# Patient Record
Sex: MALE | Race: ASIAN | NOT HISPANIC OR LATINO | URBAN - METROPOLITAN AREA
[De-identification: names, ages, dates, MRNs, and addresses within clinical notes are randomized per-mention and may not be internally consistent; named-entity substitution may affect disease eponyms.]

---

## 2018-09-20 ENCOUNTER — NEW PATIENT (OUTPATIENT)
Dept: URBAN - METROPOLITAN AREA CLINIC 56 | Facility: CLINIC | Age: 43
End: 2018-09-20
Payer: COMMERCIAL

## 2018-09-20 DIAGNOSIS — H17.12 CENTRAL CORNEAL OPACITY OF LEFT EYE: ICD-10-CM

## 2018-09-20 DIAGNOSIS — H11.153 PINGUECULA, BILATERAL: Primary | ICD-10-CM

## 2018-09-20 PROCEDURE — 92004 COMPRE OPH EXAM NEW PT 1/>: CPT | Performed by: OPTOMETRIST

## 2018-09-20 ASSESSMENT — VISUAL ACUITY
OS: 20/80
OD: 20/20

## 2018-09-20 ASSESSMENT — INTRAOCULAR PRESSURE
OD: 16
OS: 17

## 2018-09-20 ASSESSMENT — KERATOMETRY
OD: 41.88
OS: 38.40

## 2018-10-17 ENCOUNTER — FOLLOW UP ESTABLISHED (OUTPATIENT)
Dept: URBAN - METROPOLITAN AREA CLINIC 44 | Facility: CLINIC | Age: 43
End: 2018-10-17
Payer: COMMERCIAL

## 2018-10-17 PROCEDURE — 76514 ECHO EXAM OF EYE THICKNESS: CPT | Performed by: OPHTHALMOLOGY

## 2018-10-17 PROCEDURE — 92025 CPTRIZED CORNEAL TOPOGRAPHY: CPT | Performed by: OPHTHALMOLOGY

## 2018-10-17 PROCEDURE — 92014 COMPRE OPH EXAM EST PT 1/>: CPT | Performed by: OPHTHALMOLOGY

## 2018-10-17 ASSESSMENT — INTRAOCULAR PRESSURE
OS: 12
OD: 9

## 2018-10-17 ASSESSMENT — VISUAL ACUITY
OS: 20/80
OD: 20/20

## 2019-10-14 ENCOUNTER — FOLLOW UP ESTABLISHED (OUTPATIENT)
Dept: URBAN - METROPOLITAN AREA CLINIC 51 | Facility: CLINIC | Age: 44
End: 2019-10-14
Payer: MEDICARE

## 2019-10-14 DIAGNOSIS — H35.411 LATTICE DEGENERATION OF RETINA, RIGHT EYE: ICD-10-CM

## 2019-10-14 PROCEDURE — 92014 COMPRE OPH EXAM EST PT 1/>: CPT | Performed by: OPTOMETRIST

## 2019-10-14 ASSESSMENT — KERATOMETRY: OD: 41.70

## 2019-10-14 ASSESSMENT — INTRAOCULAR PRESSURE
OS: 12
OD: 14

## 2019-11-13 ENCOUNTER — FOLLOW UP ESTABLISHED (OUTPATIENT)
Dept: URBAN - METROPOLITAN AREA CLINIC 44 | Facility: CLINIC | Age: 44
End: 2019-11-13
Payer: COMMERCIAL

## 2019-11-13 PROCEDURE — 92025 CPTRIZED CORNEAL TOPOGRAPHY: CPT | Performed by: OPHTHALMOLOGY

## 2019-11-13 PROCEDURE — 76514 ECHO EXAM OF EYE THICKNESS: CPT | Performed by: OPHTHALMOLOGY

## 2019-11-13 PROCEDURE — 92012 INTRM OPH EXAM EST PATIENT: CPT | Performed by: OPHTHALMOLOGY

## 2019-11-13 RX ORDER — PREDNISOLONE ACETATE 10 MG/ML
1 % SUSPENSION/ DROPS OPHTHALMIC
Qty: 1 | Refills: 3 | Status: INACTIVE
Start: 2019-11-13 | End: 2020-08-11

## 2019-11-13 RX ORDER — ACYCLOVIR 400 MG/1
400 MG TABLET ORAL
Qty: 60 | Refills: 1 | Status: INACTIVE
Start: 2019-11-13 | End: 2020-06-01

## 2019-11-13 ASSESSMENT — VISUAL ACUITY
OS: 20/CF 2'
OD: 20/20

## 2019-11-13 ASSESSMENT — INTRAOCULAR PRESSURE
OS: 17
OD: 15

## 2019-11-18 ENCOUNTER — FOLLOW UP ESTABLISHED (OUTPATIENT)
Dept: URBAN - METROPOLITAN AREA CLINIC 51 | Facility: CLINIC | Age: 44
End: 2019-11-18
Payer: COMMERCIAL

## 2019-11-18 PROCEDURE — 92012 INTRM OPH EXAM EST PATIENT: CPT | Performed by: OPTOMETRIST

## 2019-11-18 ASSESSMENT — INTRAOCULAR PRESSURE
OS: 11
OD: 14

## 2020-05-18 ENCOUNTER — FOLLOW UP ESTABLISHED (OUTPATIENT)
Dept: URBAN - METROPOLITAN AREA CLINIC 44 | Facility: CLINIC | Age: 45
End: 2020-05-18
Payer: COMMERCIAL

## 2020-05-18 DIAGNOSIS — H16.322 DIFFUSE INTERSTITIAL KERATITIS, LEFT EYE: Primary | ICD-10-CM

## 2020-05-18 PROCEDURE — 92012 INTRM OPH EXAM EST PATIENT: CPT | Performed by: OPHTHALMOLOGY

## 2020-05-18 RX ORDER — OFLOXACIN 3 MG/ML
0.3 % SOLUTION/ DROPS OPHTHALMIC
Qty: 1 | Refills: 1 | Status: INACTIVE
Start: 2020-05-18 | End: 2020-07-13

## 2020-05-18 RX ORDER — PREDNISOLONE ACETATE 10 MG/ML
1 % SUSPENSION/ DROPS OPHTHALMIC
Qty: 1 | Refills: 3 | Status: INACTIVE
Start: 2020-05-18 | End: 2020-07-13

## 2020-05-18 ASSESSMENT — INTRAOCULAR PRESSURE
OD: 17
OS: 16

## 2020-07-13 ENCOUNTER — FOLLOW UP ESTABLISHED (OUTPATIENT)
Dept: URBAN - METROPOLITAN AREA CLINIC 44 | Facility: CLINIC | Age: 45
End: 2020-07-13
Payer: COMMERCIAL

## 2020-07-13 PROCEDURE — 76514 ECHO EXAM OF EYE THICKNESS: CPT | Performed by: OPHTHALMOLOGY

## 2020-07-13 PROCEDURE — 92014 COMPRE OPH EXAM EST PT 1/>: CPT | Performed by: OPHTHALMOLOGY

## 2020-07-13 RX ORDER — ACYCLOVIR 400 MG/1
400 MG TABLET ORAL
Qty: 90 | Refills: 3 | Status: INACTIVE
Start: 2020-07-13 | End: 2020-09-01

## 2020-07-13 RX ORDER — PREDNISOLONE ACETATE 10 MG/ML
1 % SUSPENSION/ DROPS OPHTHALMIC
Qty: 2 | Refills: 3 | Status: INACTIVE
Start: 2020-07-13 | End: 2021-04-30

## 2020-07-13 RX ORDER — OFLOXACIN 3 MG/ML
0.3 % SOLUTION/ DROPS OPHTHALMIC
Qty: 2 | Refills: 1 | Status: INACTIVE
Start: 2020-07-13 | End: 2020-08-18

## 2020-07-13 ASSESSMENT — VISUAL ACUITY
OD: 20/20
OS: 20/70

## 2020-07-13 ASSESSMENT — KERATOMETRY
OD: 41.75
OS: 38.38

## 2020-07-13 ASSESSMENT — INTRAOCULAR PRESSURE
OD: 19
OS: 13

## 2020-07-27 ENCOUNTER — Encounter (OUTPATIENT)
Dept: URBAN - METROPOLITAN AREA CLINIC 44 | Facility: CLINIC | Age: 45
End: 2020-07-27
Payer: COMMERCIAL

## 2020-07-27 DIAGNOSIS — Z01.818 ENCOUNTER FOR OTHER PREPROCEDURAL EXAMINATION: Primary | ICD-10-CM

## 2020-07-27 PROCEDURE — 99213 OFFICE O/P EST LOW 20 MIN: CPT | Performed by: PHYSICIAN ASSISTANT

## 2020-08-10 ENCOUNTER — SURGERY (OUTPATIENT)
Dept: URBAN - METROPOLITAN AREA SURGERY 19 | Facility: SURGERY | Age: 45
End: 2020-08-10
Payer: COMMERCIAL

## 2020-08-10 PROCEDURE — 65730 CORNEAL TRANSPLANT: CPT | Performed by: OPHTHALMOLOGY

## 2020-08-10 RX ORDER — ACETAMINOPHEN AND CODEINE PHOSPHATE 300; 30 MG/1; MG/1
TABLET ORAL
Qty: 15 | Refills: 0 | Status: ACTIVE
Start: 2020-08-10

## 2020-08-11 ENCOUNTER — POST OP (OUTPATIENT)
Dept: URBAN - METROPOLITAN AREA CLINIC 44 | Facility: CLINIC | Age: 45
End: 2020-08-11

## 2020-08-11 PROCEDURE — 99024 POSTOP FOLLOW-UP VISIT: CPT | Performed by: OPTOMETRIST

## 2020-08-11 ASSESSMENT — INTRAOCULAR PRESSURE: OS: 14

## 2020-08-14 ENCOUNTER — POST OP (OUTPATIENT)
Dept: URBAN - METROPOLITAN AREA CLINIC 51 | Facility: CLINIC | Age: 45
End: 2020-08-14
Payer: COMMERCIAL

## 2020-08-14 PROCEDURE — 99024 POSTOP FOLLOW-UP VISIT: CPT | Performed by: OPTOMETRIST

## 2020-08-14 ASSESSMENT — INTRAOCULAR PRESSURE: OS: 14

## 2020-08-18 ENCOUNTER — POST OP (OUTPATIENT)
Dept: URBAN - METROPOLITAN AREA CLINIC 44 | Facility: CLINIC | Age: 45
End: 2020-08-18

## 2020-08-18 PROCEDURE — 99024 POSTOP FOLLOW-UP VISIT: CPT | Performed by: OPHTHALMOLOGY

## 2020-08-18 RX ORDER — DOXYCYCLINE HYCLATE 100 MG/1
100 MG TABLET, COATED ORAL
Qty: 30 | Refills: 3 | Status: ACTIVE
Start: 2020-08-18

## 2020-08-18 ASSESSMENT — INTRAOCULAR PRESSURE
OD: 15
OS: 21

## 2020-09-01 ENCOUNTER — POST OP (OUTPATIENT)
Dept: URBAN - METROPOLITAN AREA CLINIC 44 | Facility: CLINIC | Age: 45
End: 2020-09-01

## 2020-09-01 PROCEDURE — 99024 POSTOP FOLLOW-UP VISIT: CPT | Performed by: OPHTHALMOLOGY

## 2020-09-01 RX ORDER — ACYCLOVIR 400 MG/1
400 MG TABLET ORAL
Qty: 90 | Refills: 3 | Status: INACTIVE
Start: 2020-09-01 | End: 2021-10-29

## 2020-09-01 ASSESSMENT — INTRAOCULAR PRESSURE
OS: 15
OD: 16

## 2020-09-29 ENCOUNTER — FOLLOW UP ESTABLISHED (OUTPATIENT)
Dept: URBAN - METROPOLITAN AREA CLINIC 44 | Facility: CLINIC | Age: 45
End: 2020-09-29
Payer: COMMERCIAL

## 2020-09-29 DIAGNOSIS — Z94.7 CORNEAL TRANSPLANT STATUS: Primary | ICD-10-CM

## 2020-09-29 PROCEDURE — 99024 POSTOP FOLLOW-UP VISIT: CPT | Performed by: OPHTHALMOLOGY

## 2020-09-29 ASSESSMENT — INTRAOCULAR PRESSURE: OS: 18

## 2020-11-10 ENCOUNTER — FOLLOW UP ESTABLISHED (OUTPATIENT)
Dept: URBAN - METROPOLITAN AREA CLINIC 44 | Facility: CLINIC | Age: 45
End: 2020-11-10
Payer: COMMERCIAL

## 2020-11-10 PROCEDURE — 99024 POSTOP FOLLOW-UP VISIT: CPT | Performed by: OPHTHALMOLOGY

## 2020-11-10 ASSESSMENT — INTRAOCULAR PRESSURE
OS: 14
OD: 13

## 2021-02-16 ENCOUNTER — FOLLOW UP ESTABLISHED (OUTPATIENT)
Dept: URBAN - METROPOLITAN AREA CLINIC 44 | Facility: CLINIC | Age: 46
End: 2021-02-16
Payer: COMMERCIAL

## 2021-02-16 PROCEDURE — 76514 ECHO EXAM OF EYE THICKNESS: CPT | Performed by: OPHTHALMOLOGY

## 2021-02-16 PROCEDURE — 99213 OFFICE O/P EST LOW 20 MIN: CPT | Performed by: OPHTHALMOLOGY

## 2021-02-16 ASSESSMENT — INTRAOCULAR PRESSURE: OS: 15

## 2021-06-08 ENCOUNTER — OFFICE VISIT (OUTPATIENT)
Dept: URBAN - METROPOLITAN AREA CLINIC 44 | Facility: CLINIC | Age: 46
End: 2021-06-08
Payer: COMMERCIAL

## 2021-06-08 PROCEDURE — 92025 CPTRIZED CORNEAL TOPOGRAPHY: CPT | Performed by: OPHTHALMOLOGY

## 2021-06-08 PROCEDURE — 76514 ECHO EXAM OF EYE THICKNESS: CPT | Performed by: OPHTHALMOLOGY

## 2021-06-08 PROCEDURE — 99213 OFFICE O/P EST LOW 20 MIN: CPT | Performed by: OPHTHALMOLOGY

## 2021-06-08 RX ORDER — OFLOXACIN 3 MG/ML
0.3 % SOLUTION/ DROPS OPHTHALMIC
Qty: 5 | Refills: 1 | Status: INACTIVE
Start: 2021-06-08 | End: 2021-10-26

## 2021-06-08 ASSESSMENT — VISUAL ACUITY: OS: 20/80

## 2021-06-08 ASSESSMENT — KERATOMETRY
OD: 41.50
OS: 44.25

## 2021-06-08 ASSESSMENT — INTRAOCULAR PRESSURE
OD: 16
OS: 21

## 2021-06-08 NOTE — IMPRESSION/PLAN
Impression: Central corneal opacity of left eye
- s/p PKP OS 8/10/2020 Plan: POM#10, doing well. Sutures removed at 4 and 7:00, start oflox TID x 3 days then d/c. Cont pred to qd, do not stop. Continue ACV 400mg qD Precautions reviewed. 
RTC 2-3 months, IOP check, alayna, pachy, refract OS, suture removal

## 2021-09-14 ENCOUNTER — OFFICE VISIT (OUTPATIENT)
Dept: URBAN - METROPOLITAN AREA CLINIC 44 | Facility: CLINIC | Age: 46
End: 2021-09-14
Payer: COMMERCIAL

## 2021-09-14 PROCEDURE — 92025 CPTRIZED CORNEAL TOPOGRAPHY: CPT | Performed by: OPHTHALMOLOGY

## 2021-09-14 PROCEDURE — 99213 OFFICE O/P EST LOW 20 MIN: CPT | Performed by: OPHTHALMOLOGY

## 2021-09-14 ASSESSMENT — INTRAOCULAR PRESSURE
OD: 18
OS: 21

## 2021-09-14 ASSESSMENT — VISUAL ACUITY
OS: 20/60
OD: 20/15

## 2021-09-14 NOTE — IMPRESSION/PLAN
Impression: Central corneal opacity of left eye
- s/p PKP OS 8/10/2020 Plan: POY#1, doing well. Sutures removed at 4 and 7:00, start oflox TID x 3 days then d/c. Cont pred to qd, do not stop. Continue ACV 400mg qD Precautions reviewed. 
RTC IOP check, alayna, pachy, refract OS, suture removal

## 2021-10-26 ENCOUNTER — OFFICE VISIT (OUTPATIENT)
Dept: URBAN - METROPOLITAN AREA CLINIC 44 | Facility: CLINIC | Age: 46
End: 2021-10-26
Payer: COMMERCIAL

## 2021-10-26 PROCEDURE — 76514 ECHO EXAM OF EYE THICKNESS: CPT | Performed by: OPHTHALMOLOGY

## 2021-10-26 PROCEDURE — 99213 OFFICE O/P EST LOW 20 MIN: CPT | Performed by: OPHTHALMOLOGY

## 2021-10-26 PROCEDURE — 92025 CPTRIZED CORNEAL TOPOGRAPHY: CPT | Performed by: OPHTHALMOLOGY

## 2021-10-26 RX ORDER — FLUOROMETHOLONE 1 MG/ML
0.1 % SOLUTION/ DROPS OPHTHALMIC
Qty: 5 | Refills: 4 | Status: ACTIVE
Start: 2021-10-26

## 2021-10-26 ASSESSMENT — INTRAOCULAR PRESSURE
OS: 21
OD: 18

## 2021-10-26 ASSESSMENT — VISUAL ACUITY: OS: 20/50

## 2021-10-26 NOTE — IMPRESSION/PLAN
Impression: Unspecified corneal degeneration: H18.40.
- s/p PKP OS 8/10/2020 Plan: POY#1, doing well. 
2 sutures removed, start oflox TID x 3 days then d/c. Cont pred qd, when out switch to [employer] 0.1% qd do not stop. Continue ACV 400mg qD Precautions reviewed. 
RTC IOP check, alayna, pachy, refract OS, suture removal
 not examined

## 2021-10-27 ENCOUNTER — OFFICE VISIT (OUTPATIENT)
Dept: URBAN - METROPOLITAN AREA CLINIC 44 | Facility: CLINIC | Age: 46
End: 2021-10-27
Payer: COMMERCIAL

## 2021-10-27 DIAGNOSIS — H18.40 UNSPECIFIED CORNEAL DEGENERATION: Primary | ICD-10-CM

## 2021-10-27 PROCEDURE — 99213 OFFICE O/P EST LOW 20 MIN: CPT | Performed by: OPHTHALMOLOGY

## 2021-10-27 ASSESSMENT — INTRAOCULAR PRESSURE
OS: 16
OD: 13

## 2021-10-27 NOTE — IMPRESSION/PLAN
Impression: Unspecified corneal degeneration: H18.40.
- s/p PKP OS 8/10/2020 Plan: POY#1, doing well. 1 filament removed, increase lubrication. Cont pred qd, when out switch to [id number] 0.1% qd do not stop. Continue ACV 400mg qD Precautions reviewed. 
RTC IOP check, alayna, pachy, refract OS, suture removal

## 2022-01-06 ENCOUNTER — OFFICE VISIT (OUTPATIENT)
Dept: URBAN - METROPOLITAN AREA CLINIC 44 | Facility: CLINIC | Age: 47
End: 2022-01-06
Payer: COMMERCIAL

## 2022-01-06 DIAGNOSIS — H00.014 HORDEOLUM EXTERNUM LEFT UPPER EYELID: Primary | ICD-10-CM

## 2022-01-06 PROCEDURE — 99214 OFFICE O/P EST MOD 30 MIN: CPT | Performed by: OPTOMETRIST

## 2022-01-06 RX ORDER — CEPHALEXIN 750 MG/1
750 MG CAPSULE ORAL
Qty: 14 | Refills: 0 | Status: ACTIVE
Start: 2022-01-06

## 2022-01-06 NOTE — IMPRESSION/PLAN
Impression: Hordeolum externum left upper eyelid: H00.014. Plan: PLAN: Discussed findings with patient.  Rec lid hygiene, warm compress 4 x a day 10 min intervals and Start Keflex (generic) 750 BID 1 week and RTC PRN

## 2022-01-18 ENCOUNTER — OFFICE VISIT (OUTPATIENT)
Dept: URBAN - METROPOLITAN AREA CLINIC 44 | Facility: CLINIC | Age: 47
End: 2022-01-18
Payer: COMMERCIAL

## 2022-01-18 PROCEDURE — 76514 ECHO EXAM OF EYE THICKNESS: CPT | Performed by: OPHTHALMOLOGY

## 2022-01-18 PROCEDURE — 99213 OFFICE O/P EST LOW 20 MIN: CPT | Performed by: OPHTHALMOLOGY

## 2022-01-18 PROCEDURE — 92025 CPTRIZED CORNEAL TOPOGRAPHY: CPT | Performed by: OPHTHALMOLOGY

## 2022-01-18 ASSESSMENT — VISUAL ACUITY: OS: 20/30

## 2022-01-18 ASSESSMENT — INTRAOCULAR PRESSURE: OD: 18

## 2022-01-18 NOTE — IMPRESSION/PLAN
Impression: Unspecified corneal degeneration: H18.40.
- s/p PKP OS 8/10/2020 Plan: POY#1, doing well. Sutures removed x 2, start oflox TID x 3 days then d/c. Cont pred to qd, do not stop. Continue ACV 400mg qD Precautions reviewed. 
RTC IOP check, alayna, refract OS, suture removal

## 2022-03-01 ENCOUNTER — OFFICE VISIT (OUTPATIENT)
Dept: URBAN - METROPOLITAN AREA CLINIC 44 | Facility: CLINIC | Age: 47
End: 2022-03-01
Payer: COMMERCIAL

## 2022-03-01 PROCEDURE — 99213 OFFICE O/P EST LOW 20 MIN: CPT | Performed by: OPHTHALMOLOGY

## 2022-03-01 PROCEDURE — 92025 CPTRIZED CORNEAL TOPOGRAPHY: CPT | Performed by: OPHTHALMOLOGY

## 2022-03-01 ASSESSMENT — INTRAOCULAR PRESSURE
OD: 16
OS: 19

## 2022-03-01 ASSESSMENT — VISUAL ACUITY: OS: 20/60

## 2022-03-01 NOTE — IMPRESSION/PLAN
Impression: Unspecified corneal degeneration: H18.40.
- s/p PKP OS 8/10/2020 Plan: POY#1, doing well. 1 suture removed, start oflox TID x 3 days then d/c. Cont pred qd, when out switch to [address] 0.1% qd do not stop. Continue ACV 400mg qD Precautions reviewed. 
RTC IOP check, alayna, pachy, auto, refract OS, suture removal

## 2022-05-16 ENCOUNTER — OFFICE VISIT (OUTPATIENT)
Dept: URBAN - METROPOLITAN AREA CLINIC 44 | Facility: CLINIC | Age: 47
End: 2022-05-16
Payer: COMMERCIAL

## 2022-05-16 DIAGNOSIS — H18.40 UNSPECIFIED CORNEAL DEGENERATION: Primary | ICD-10-CM

## 2022-05-16 PROCEDURE — 76514 ECHO EXAM OF EYE THICKNESS: CPT | Performed by: OPHTHALMOLOGY

## 2022-05-16 PROCEDURE — 92025 CPTRIZED CORNEAL TOPOGRAPHY: CPT | Performed by: OPHTHALMOLOGY

## 2022-05-16 PROCEDURE — 99213 OFFICE O/P EST LOW 20 MIN: CPT | Performed by: OPHTHALMOLOGY

## 2022-05-16 ASSESSMENT — INTRAOCULAR PRESSURE
OD: 16
OS: 19

## 2022-05-16 ASSESSMENT — VISUAL ACUITY: OS: 20/25

## 2022-05-16 NOTE — IMPRESSION/PLAN
Impression: Unspecified corneal degeneration: H18.40.
- s/p PKP OS 8/10/2020 Plan: POY#1.5, doing well. Cyl <2D, no further suture removal recommended at this time. Cont FML 0.1% qd do not stop. Continue ACV 400mg qD until out Precautions reviewed. 
May see optom for updated refraction/CTL fitting

## 2022-11-22 ENCOUNTER — OFFICE VISIT (OUTPATIENT)
Dept: URBAN - METROPOLITAN AREA CLINIC 44 | Facility: CLINIC | Age: 47
End: 2022-11-22
Payer: COMMERCIAL

## 2022-11-22 DIAGNOSIS — H18.40 UNSPECIFIED CORNEAL DEGENERATION: Primary | ICD-10-CM

## 2022-11-22 PROCEDURE — 92025 CPTRIZED CORNEAL TOPOGRAPHY: CPT | Performed by: OPHTHALMOLOGY

## 2022-11-22 PROCEDURE — 99213 OFFICE O/P EST LOW 20 MIN: CPT | Performed by: OPHTHALMOLOGY

## 2022-11-22 RX ORDER — FLUOROMETHOLONE 1 MG/ML
0.1 % SOLUTION/ DROPS OPHTHALMIC
Qty: 5 | Refills: 4 | Status: ACTIVE
Start: 2022-11-22

## 2022-11-22 ASSESSMENT — INTRAOCULAR PRESSURE
OS: 14
OD: 14

## 2022-11-22 NOTE — IMPRESSION/PLAN
Impression: Unspecified corneal degeneration: H18.40.
- s/p PKP OS 8/10/2020 Plan: POY#2, doing well. Cyl <2D, no further suture removal recommended at this time. Cont FML 0.1% qd do not stop. Continue ACV 400mg qD until out Precautions reviewed. May see optom for updated refraction/CTL fitting, cornea prn. May call and be seen if irritation occurs- may be loose suture.

## 2023-08-28 ENCOUNTER — OFFICE VISIT (OUTPATIENT)
Dept: URBAN - METROPOLITAN AREA CLINIC 44 | Facility: CLINIC | Age: 48
End: 2023-08-28
Payer: COMMERCIAL

## 2023-08-28 DIAGNOSIS — H18.40 UNSPECIFIED CORNEAL DEGENERATION: Primary | ICD-10-CM

## 2023-08-28 DIAGNOSIS — H04.123 TEAR FILM INSUFFICIENCY OF BILATERAL LACRIMAL GLANDS: ICD-10-CM

## 2023-08-28 PROCEDURE — 99214 OFFICE O/P EST MOD 30 MIN: CPT | Performed by: OPTOMETRIST

## 2023-08-28 RX ORDER — OFLOXACIN 3 MG/ML
0.3 % SOLUTION/ DROPS OPHTHALMIC
Qty: 5 | Refills: 1 | Status: ACTIVE
Start: 2023-08-28

## 2023-08-28 ASSESSMENT — VISUAL ACUITY
OD: 20/20
OS: 20/300

## 2023-08-28 ASSESSMENT — KERATOMETRY
OS: 47.38
OD: 41.88

## 2023-08-28 ASSESSMENT — INTRAOCULAR PRESSURE
OD: 14
OS: 20

## 2024-02-28 ENCOUNTER — OFFICE VISIT (OUTPATIENT)
Dept: URBAN - METROPOLITAN AREA CLINIC 44 | Facility: CLINIC | Age: 49
End: 2024-02-28
Payer: COMMERCIAL

## 2024-02-28 DIAGNOSIS — H18.40 UNSPECIFIED CORNEAL DEGENERATION: Primary | ICD-10-CM

## 2024-02-28 PROCEDURE — 99212 OFFICE O/P EST SF 10 MIN: CPT | Performed by: OPTOMETRIST

## 2024-02-28 ASSESSMENT — INTRAOCULAR PRESSURE
OD: 16
OS: 18

## 2024-08-28 ENCOUNTER — OFFICE VISIT (OUTPATIENT)
Dept: URBAN - METROPOLITAN AREA CLINIC 44 | Facility: CLINIC | Age: 49
End: 2024-08-28
Payer: COMMERCIAL

## 2024-08-28 DIAGNOSIS — H18.40 UNSPECIFIED CORNEAL DEGENERATION: Primary | ICD-10-CM

## 2024-08-28 DIAGNOSIS — H25.13 AGE-RELATED NUCLEAR CATARACT, BILATERAL: ICD-10-CM

## 2024-08-28 PROCEDURE — 92014 COMPRE OPH EXAM EST PT 1/>: CPT | Performed by: OPTOMETRIST

## 2024-08-28 ASSESSMENT — VISUAL ACUITY
OD: 20/20
OS: 20/400

## 2024-08-28 ASSESSMENT — KERATOMETRY
OS: 46.88
OD: 21.75

## 2024-08-28 ASSESSMENT — INTRAOCULAR PRESSURE
OS: 17
OD: 12

## 2024-10-08 ENCOUNTER — OFFICE VISIT (OUTPATIENT)
Dept: URBAN - METROPOLITAN AREA CLINIC 56 | Facility: LOCATION | Age: 49
End: 2024-10-08
Payer: COMMERCIAL

## 2024-10-08 DIAGNOSIS — H18.40 UNSPECIFIED CORNEAL DEGENERATION: Primary | ICD-10-CM

## 2024-10-08 PROCEDURE — 99213 OFFICE O/P EST LOW 20 MIN: CPT | Performed by: OPTOMETRIST

## 2024-10-08 ASSESSMENT — INTRAOCULAR PRESSURE
OD: 14
OS: 18